# Patient Record
Sex: FEMALE | Race: WHITE | NOT HISPANIC OR LATINO | Employment: OTHER | URBAN - METROPOLITAN AREA
[De-identification: names, ages, dates, MRNs, and addresses within clinical notes are randomized per-mention and may not be internally consistent; named-entity substitution may affect disease eponyms.]

---

## 2020-12-15 NOTE — PROGRESS NOTES
Subjective:       Patient ID: Xochilt Self is a 35 y.o. female.    Chief Complaint:      Has had years of ( adult onset ) persistent asthma nd allergies. For follow up nd to get established at the clinic, [facility].    HPI:      Middle aged WF , with adult onset , labile and with everyday cough, wheeze and dyspnea. Was under the care of DR. Torsten Colorado Jr.  In spite of best medical Rx was always symptomatic. Could not tolerate Singulair and ICS- LAB combinations.    Currently doing well on ICS only QVAR and a OCTAVIO for rescue.    Dr Colorado did allergy work up and started on SCIT, was taken for 3 years. SCIT was discontinued 4 years ago. Now on symptoms Rx for allergies    Has a history of recurrent sinus infections.  Ex cigarette smoker, 10 cigs per day for 5 years. Quit smoking 12 years ago    No outpatient medications have been marked as taking for the 12/16/20 encounter (Appointment) with Jimi Robert MD.        Patient has no allergy information on record.     No past medical history on file.    No family history on file.    Environmental History:   Well versed with and has  successfully instituted environmental control measures      Review of Systems    Objective:     There were no vitals taken for this visit.    Physical Exam      Assessment:      1. Moderate persistent asthma without complication    2. Recurrent sinusitis    3. Seasonal allergic rhinitis due to pollen    4. Bronchitis    5. Seasonal allergic conjunctivitis    6       EIB  7       USED TO BE ON  SCIT FOR ALLERGIC RHINITIS, BY TORSTEN COLORADO JR, MD for 3 years. SCIT was discontinued over 4 years ago.  8        Smoked 10 cigarettes per day for 5 years. Quit smoking 12 years ago  9       Multiple drug allergies, sulfamethoxazole and Xopenex. Singulair made her crazy    Plan:       QVAR 80 Redihaler two puffs bid everyday. Gargle throat after.  CAN NOT TAKE ICS- LABA combinations, which made asthma worse  Proair HFA 90 mcg 2 puffs tid prn.  Takes 3 PUFFS IN THE AFTERNOON AND AT 9 PM, everyday, with good result.  Claritin 10 mg qd  Nasacort 55 mcg 1- 2 sprays per nares qd\bid  N acetyl cystine   BID everyday, as a mucolytic agent. Helping a lot  Re emphasized environmenatl control measures  Treat all infections.  Annual influenza vaccinations.  Due to COVID 19 protocol, spirogram and Fe NO could not be done    Home peak flow monitoring recommended. Peak Flow Meter provided and its use demonstrated  FOLLOW UP IN 6 MONTHS      Follow up in 4 months

## 2020-12-16 ENCOUNTER — OFFICE VISIT (OUTPATIENT)
Dept: ALLERGY | Facility: CLINIC | Age: 35
End: 2020-12-16
Payer: COMMERCIAL

## 2020-12-16 VITALS
BODY MASS INDEX: 33.02 KG/M2 | HEART RATE: 65 BPM | SYSTOLIC BLOOD PRESSURE: 125 MMHG | WEIGHT: 179.44 LBS | TEMPERATURE: 97 F | DIASTOLIC BLOOD PRESSURE: 76 MMHG | HEIGHT: 62 IN

## 2020-12-16 DIAGNOSIS — J40 BRONCHITIS: ICD-10-CM

## 2020-12-16 DIAGNOSIS — J30.1 SEASONAL ALLERGIC RHINITIS DUE TO POLLEN: ICD-10-CM

## 2020-12-16 DIAGNOSIS — J45.40 MODERATE PERSISTENT ASTHMA WITHOUT COMPLICATION: Primary | ICD-10-CM

## 2020-12-16 DIAGNOSIS — J32.9 RECURRENT SINUSITIS: ICD-10-CM

## 2020-12-16 DIAGNOSIS — H10.10 SEASONAL ALLERGIC CONJUNCTIVITIS: ICD-10-CM

## 2020-12-16 PROCEDURE — 99214 OFFICE O/P EST MOD 30 MIN: CPT | Mod: S$GLB,,, | Performed by: SPECIALIST

## 2020-12-16 PROCEDURE — 3008F PR BODY MASS INDEX (BMI) DOCUMENTED: ICD-10-PCS | Mod: CPTII,S$GLB,, | Performed by: SPECIALIST

## 2020-12-16 PROCEDURE — 3008F BODY MASS INDEX DOCD: CPT | Mod: CPTII,S$GLB,, | Performed by: SPECIALIST

## 2020-12-16 PROCEDURE — 99999 PR PBB SHADOW E&M-NEW PATIENT-LVL III: ICD-10-PCS | Mod: PBBFAC,,, | Performed by: SPECIALIST

## 2020-12-16 PROCEDURE — 99999 PR PBB SHADOW E&M-NEW PATIENT-LVL III: CPT | Mod: PBBFAC,,, | Performed by: SPECIALIST

## 2020-12-16 PROCEDURE — 99214 PR OFFICE/OUTPT VISIT, EST, LEVL IV, 30-39 MIN: ICD-10-PCS | Mod: S$GLB,,, | Performed by: SPECIALIST

## 2020-12-16 RX ORDER — ALBUTEROL SULFATE 90 UG/1
2 AEROSOL, METERED RESPIRATORY (INHALATION) EVERY 6 HOURS PRN
Qty: 18 G | Refills: 6 | Status: SHIPPED | OUTPATIENT
Start: 2020-12-16 | End: 2021-06-16

## 2020-12-16 RX ORDER — SULFAMETHOXAZOLE AND TRIMETHOPRIM 800; 160 MG/1; MG/1
TABLET ORAL
COMMUNITY
Start: 2020-11-17

## 2020-12-16 RX ORDER — LORATADINE 10 MG/1
10 TABLET ORAL DAILY PRN
COMMUNITY

## 2020-12-16 RX ORDER — ALBUTEROL SULFATE 90 UG/1
AEROSOL, METERED RESPIRATORY (INHALATION)
COMMUNITY
Start: 2020-10-27 | End: 2020-12-16

## 2020-12-16 RX ORDER — BECLOMETHASONE DIPROPIONATE HFA 80 UG/1
2 AEROSOL, METERED RESPIRATORY (INHALATION) 2 TIMES DAILY
Qty: 10.6 G | Refills: 6 | Status: SHIPPED | OUTPATIENT
Start: 2020-12-16 | End: 2021-06-16

## 2021-03-01 ENCOUNTER — PATIENT MESSAGE (OUTPATIENT)
Dept: GASTROENTEROLOGY | Facility: CLINIC | Age: 36
End: 2021-03-01

## 2021-03-01 ENCOUNTER — TELEPHONE (OUTPATIENT)
Dept: GASTROENTEROLOGY | Facility: CLINIC | Age: 36
End: 2021-03-01

## 2021-03-01 ENCOUNTER — OFFICE VISIT (OUTPATIENT)
Dept: GASTROENTEROLOGY | Facility: CLINIC | Age: 36
End: 2021-03-01
Payer: COMMERCIAL

## 2021-03-01 ENCOUNTER — LAB VISIT (OUTPATIENT)
Dept: LAB | Facility: HOSPITAL | Age: 36
End: 2021-03-01
Attending: INTERNAL MEDICINE
Payer: COMMERCIAL

## 2021-03-01 VITALS
SYSTOLIC BLOOD PRESSURE: 122 MMHG | HEIGHT: 62 IN | BODY MASS INDEX: 33.02 KG/M2 | HEART RATE: 62 BPM | DIASTOLIC BLOOD PRESSURE: 80 MMHG | WEIGHT: 179.44 LBS

## 2021-03-01 DIAGNOSIS — K58.1 IRRITABLE BOWEL SYNDROME WITH CONSTIPATION: ICD-10-CM

## 2021-03-01 DIAGNOSIS — R14.0 BLOATING: ICD-10-CM

## 2021-03-01 DIAGNOSIS — K58.1 IRRITABLE BOWEL SYNDROME WITH CONSTIPATION: Primary | ICD-10-CM

## 2021-03-01 LAB
BASOPHILS # BLD AUTO: 0.04 K/UL (ref 0–0.2)
BASOPHILS NFR BLD: 0.4 % (ref 0–1.9)
DIFFERENTIAL METHOD: ABNORMAL
EOSINOPHIL # BLD AUTO: 0.3 K/UL (ref 0–0.5)
EOSINOPHIL NFR BLD: 2.9 % (ref 0–8)
ERYTHROCYTE [DISTWIDTH] IN BLOOD BY AUTOMATED COUNT: 13 % (ref 11.5–14.5)
HCT VFR BLD AUTO: 37.2 % (ref 37–48.5)
HGB BLD-MCNC: 12.2 G/DL (ref 12–16)
IMM GRANULOCYTES # BLD AUTO: 0.07 K/UL (ref 0–0.04)
IMM GRANULOCYTES NFR BLD AUTO: 0.8 % (ref 0–0.5)
LYMPHOCYTES # BLD AUTO: 1.8 K/UL (ref 1–4.8)
LYMPHOCYTES NFR BLD: 19.7 % (ref 18–48)
MCH RBC QN AUTO: 30.4 PG (ref 27–31)
MCHC RBC AUTO-ENTMCNC: 32.8 G/DL (ref 32–36)
MCV RBC AUTO: 93 FL (ref 82–98)
MONOCYTES # BLD AUTO: 0.6 K/UL (ref 0.3–1)
MONOCYTES NFR BLD: 6 % (ref 4–15)
NEUTROPHILS # BLD AUTO: 6.5 K/UL (ref 1.8–7.7)
NEUTROPHILS NFR BLD: 70.2 % (ref 38–73)
NRBC BLD-RTO: 0 /100 WBC
PLATELET # BLD AUTO: 270 K/UL (ref 150–350)
PMV BLD AUTO: 10 FL (ref 9.2–12.9)
RBC # BLD AUTO: 4.01 M/UL (ref 4–5.4)
WBC # BLD AUTO: 9.28 K/UL (ref 3.9–12.7)

## 2021-03-01 PROCEDURE — 83630 LACTOFERRIN FECAL (QUAL): CPT

## 2021-03-01 PROCEDURE — 83993 ASSAY FOR CALPROTECTIN FECAL: CPT

## 2021-03-01 PROCEDURE — 83516 IMMUNOASSAY NONANTIBODY: CPT | Mod: 59

## 2021-03-01 PROCEDURE — 3008F BODY MASS INDEX DOCD: CPT | Mod: CPTII,S$GLB,, | Performed by: INTERNAL MEDICINE

## 2021-03-01 PROCEDURE — 99244 PR OFFICE CONSULTATION,LEVEL IV: ICD-10-PCS | Mod: S$GLB,,, | Performed by: INTERNAL MEDICINE

## 2021-03-01 PROCEDURE — 1126F PR PAIN SEVERITY QUANTIFIED, NO PAIN PRESENT: ICD-10-PCS | Mod: S$GLB,,, | Performed by: INTERNAL MEDICINE

## 2021-03-01 PROCEDURE — 99999 PR PBB SHADOW E&M-EST. PATIENT-LVL III: ICD-10-PCS | Mod: PBBFAC,,, | Performed by: INTERNAL MEDICINE

## 2021-03-01 PROCEDURE — 36415 COLL VENOUS BLD VENIPUNCTURE: CPT

## 2021-03-01 PROCEDURE — 85025 COMPLETE CBC W/AUTO DIFF WBC: CPT

## 2021-03-01 PROCEDURE — 80048 BASIC METABOLIC PNL TOTAL CA: CPT

## 2021-03-01 PROCEDURE — 99999 PR PBB SHADOW E&M-EST. PATIENT-LVL III: CPT | Mod: PBBFAC,,, | Performed by: INTERNAL MEDICINE

## 2021-03-01 PROCEDURE — 1126F AMNT PAIN NOTED NONE PRSNT: CPT | Mod: S$GLB,,, | Performed by: INTERNAL MEDICINE

## 2021-03-01 PROCEDURE — 3008F PR BODY MASS INDEX (BMI) DOCUMENTED: ICD-10-PCS | Mod: CPTII,S$GLB,, | Performed by: INTERNAL MEDICINE

## 2021-03-01 PROCEDURE — 99244 OFF/OP CNSLTJ NEW/EST MOD 40: CPT | Mod: S$GLB,,, | Performed by: INTERNAL MEDICINE

## 2021-03-01 RX ORDER — ALUMINUM ZIRCONIUM OCTACHLOROHYDREX GLY 16 G/100G
1 GEL TOPICAL DAILY
COMMUNITY
Start: 2021-03-01 | End: 2022-04-27

## 2021-03-01 RX ORDER — ACETAMINOPHEN 500 MG
1 TABLET ORAL 2 TIMES DAILY
COMMUNITY

## 2021-03-02 LAB
ANION GAP SERPL CALC-SCNC: 10 MMOL/L (ref 8–16)
BUN SERPL-MCNC: 9 MG/DL (ref 6–20)
CALCIUM SERPL-MCNC: 9.1 MG/DL (ref 8.7–10.5)
CHLORIDE SERPL-SCNC: 107 MMOL/L (ref 95–110)
CO2 SERPL-SCNC: 24 MMOL/L (ref 23–29)
CREAT SERPL-MCNC: 0.8 MG/DL (ref 0.5–1.4)
EST. GFR  (AFRICAN AMERICAN): >60 ML/MIN/1.73 M^2
EST. GFR  (NON AFRICAN AMERICAN): >60 ML/MIN/1.73 M^2
GLUCOSE SERPL-MCNC: 86 MG/DL (ref 70–110)
POTASSIUM SERPL-SCNC: 4.1 MMOL/L (ref 3.5–5.1)
SODIUM SERPL-SCNC: 141 MMOL/L (ref 136–145)

## 2021-03-04 LAB
GLIADIN PEPTIDE IGA SER-ACNC: 4 UNITS
GLIADIN PEPTIDE IGG SER-ACNC: 3 UNITS
IGA SERPL-MCNC: 107 MG/DL (ref 70–400)
TTG IGA SER-ACNC: 3 UNITS
TTG IGG SER-ACNC: 3 UNITS

## 2021-03-05 LAB — LACTOFERRIN STL QL IA: NEGATIVE

## 2021-03-08 ENCOUNTER — PATIENT MESSAGE (OUTPATIENT)
Dept: HEPATOLOGY | Facility: CLINIC | Age: 36
End: 2021-03-08

## 2021-03-08 LAB — CALPROTECTIN STL-MCNT: <27.1 MCG/G

## 2021-03-09 ENCOUNTER — PATIENT MESSAGE (OUTPATIENT)
Dept: GASTROENTEROLOGY | Facility: CLINIC | Age: 36
End: 2021-03-09

## 2021-03-26 ENCOUNTER — TELEPHONE (OUTPATIENT)
Dept: GASTROENTEROLOGY | Facility: CLINIC | Age: 36
End: 2021-03-26

## 2021-03-26 DIAGNOSIS — R14.0 BLOATING: ICD-10-CM

## 2021-03-26 DIAGNOSIS — K58.1 IRRITABLE BOWEL SYNDROME WITH CONSTIPATION: Primary | ICD-10-CM

## 2021-04-08 ENCOUNTER — PATIENT MESSAGE (OUTPATIENT)
Dept: GASTROENTEROLOGY | Facility: CLINIC | Age: 36
End: 2021-04-08

## 2021-04-22 ENCOUNTER — PATIENT MESSAGE (OUTPATIENT)
Dept: GASTROENTEROLOGY | Facility: CLINIC | Age: 36
End: 2021-04-22

## 2021-04-28 ENCOUNTER — PATIENT MESSAGE (OUTPATIENT)
Dept: GASTROENTEROLOGY | Facility: CLINIC | Age: 36
End: 2021-04-28

## 2021-04-29 ENCOUNTER — PATIENT MESSAGE (OUTPATIENT)
Dept: GASTROENTEROLOGY | Facility: CLINIC | Age: 36
End: 2021-04-29

## 2021-06-16 ENCOUNTER — LAB VISIT (OUTPATIENT)
Dept: LAB | Facility: HOSPITAL | Age: 36
End: 2021-06-16
Attending: SPECIALIST
Payer: COMMERCIAL

## 2021-06-16 ENCOUNTER — OFFICE VISIT (OUTPATIENT)
Dept: ALLERGY | Facility: CLINIC | Age: 36
End: 2021-06-16
Payer: COMMERCIAL

## 2021-06-16 VITALS
DIASTOLIC BLOOD PRESSURE: 81 MMHG | WEIGHT: 172.19 LBS | TEMPERATURE: 98 F | SYSTOLIC BLOOD PRESSURE: 117 MMHG | BODY MASS INDEX: 31.68 KG/M2 | HEIGHT: 62 IN | HEART RATE: 62 BPM

## 2021-06-16 DIAGNOSIS — J40 BRONCHITIS: ICD-10-CM

## 2021-06-16 DIAGNOSIS — J45.990 EXERCISE INDUCED BRONCHOSPASM: ICD-10-CM

## 2021-06-16 DIAGNOSIS — J32.9 RECURRENT SINUSITIS: ICD-10-CM

## 2021-06-16 DIAGNOSIS — J30.1 SEASONAL ALLERGIC RHINITIS DUE TO POLLEN: ICD-10-CM

## 2021-06-16 DIAGNOSIS — H10.10 SEASONAL ALLERGIC CONJUNCTIVITIS: ICD-10-CM

## 2021-06-16 DIAGNOSIS — J30.89 NON-SEASONAL ALLERGIC RHINITIS DUE TO OTHER ALLERGIC TRIGGER: ICD-10-CM

## 2021-06-16 DIAGNOSIS — J45.40 MODERATE PERSISTENT ASTHMA WITHOUT COMPLICATION: Primary | ICD-10-CM

## 2021-06-16 DIAGNOSIS — J45.40 MODERATE PERSISTENT ASTHMA WITHOUT COMPLICATION: ICD-10-CM

## 2021-06-16 LAB
IGA SERPL-MCNC: 123 MG/DL (ref 40–350)
IGE SERPL-ACNC: 134 IU/ML (ref 0–100)
IGG SERPL-MCNC: 642 MG/DL (ref 650–1600)
IGM SERPL-MCNC: 91 MG/DL (ref 50–300)

## 2021-06-16 PROCEDURE — 99999 PR PBB SHADOW E&M-EST. PATIENT-LVL III: ICD-10-PCS | Mod: PBBFAC,,, | Performed by: SPECIALIST

## 2021-06-16 PROCEDURE — 99214 PR OFFICE/OUTPT VISIT, EST, LEVL IV, 30-39 MIN: ICD-10-PCS | Mod: S$GLB,,, | Performed by: SPECIALIST

## 2021-06-16 PROCEDURE — 99214 OFFICE O/P EST MOD 30 MIN: CPT | Mod: S$GLB,,, | Performed by: SPECIALIST

## 2021-06-16 PROCEDURE — 99999 PR PBB SHADOW E&M-EST. PATIENT-LVL III: CPT | Mod: PBBFAC,,, | Performed by: SPECIALIST

## 2021-06-16 PROCEDURE — 36415 COLL VENOUS BLD VENIPUNCTURE: CPT | Performed by: SPECIALIST

## 2021-06-16 PROCEDURE — 82784 ASSAY IGA/IGD/IGG/IGM EACH: CPT | Mod: 59 | Performed by: SPECIALIST

## 2021-06-16 PROCEDURE — 3008F PR BODY MASS INDEX (BMI) DOCUMENTED: ICD-10-PCS | Mod: CPTII,S$GLB,, | Performed by: SPECIALIST

## 2021-06-16 PROCEDURE — 82784 ASSAY IGA/IGD/IGG/IGM EACH: CPT | Performed by: SPECIALIST

## 2021-06-16 PROCEDURE — 86317 IMMUNOASSAY INFECTIOUS AGENT: CPT | Mod: 59 | Performed by: SPECIALIST

## 2021-06-16 PROCEDURE — 82785 ASSAY OF IGE: CPT | Performed by: SPECIALIST

## 2021-06-16 PROCEDURE — 3008F BODY MASS INDEX DOCD: CPT | Mod: CPTII,S$GLB,, | Performed by: SPECIALIST

## 2021-06-16 RX ORDER — BECLOMETHASONE DIPROPIONATE HFA 80 UG/1
160 AEROSOL, METERED RESPIRATORY (INHALATION) 2 TIMES DAILY
Qty: 10.6 G | Refills: 5 | Status: SHIPPED | OUTPATIENT
Start: 2021-06-16 | End: 2021-07-16

## 2021-06-16 RX ORDER — ALBUTEROL SULFATE 90 UG/1
2 AEROSOL, METERED RESPIRATORY (INHALATION) EVERY 6 HOURS PRN
Qty: 18 G | Refills: 5 | Status: SHIPPED | OUTPATIENT
Start: 2021-06-16 | End: 2021-07-16

## 2021-06-16 RX ORDER — ALBUTEROL SULFATE 0.83 MG/ML
2.5 SOLUTION RESPIRATORY (INHALATION) EVERY 6 HOURS PRN
Qty: 1 BOX | Refills: 2 | Status: SHIPPED | OUTPATIENT
Start: 2021-06-16 | End: 2022-12-13

## 2021-06-16 RX ORDER — BUDESONIDE 0.5 MG/2ML
0.5 INHALANT ORAL 2 TIMES DAILY PRN
Qty: 120 ML | Refills: 2 | Status: SHIPPED | OUTPATIENT
Start: 2021-06-16 | End: 2021-08-09

## 2021-06-16 RX ORDER — BUSPIRONE HYDROCHLORIDE 10 MG/1
TABLET ORAL
COMMUNITY

## 2021-06-16 RX ORDER — PREDNISONE 20 MG/1
20 TABLET ORAL DAILY PRN
Qty: 15 TABLET | Refills: 0 | Status: SHIPPED | OUTPATIENT
Start: 2021-06-16 | End: 2021-06-21

## 2021-06-21 LAB
S PNEUM DA 1 IGG SER-MCNC: 11.4 MCG/ML
S PNEUM DA 10A IGG SER-MCNC: 10.9 MCG/ML
S PNEUM DA 11A IGG SER-MCNC: 6.9 MCG/ML
S PNEUM DA 12F IGG SER-MCNC: 12.8 MCG/ML
S PNEUM DA 14 IGG SER-MCNC: 17 MCG/ML
S PNEUM DA 15B IGG SER-MCNC: 13.4 MCG/ML
S PNEUM DA 17F IGG SER-MCNC: 46.6 MCG/ML
S PNEUM DA 18C IGG SER-MCNC: 6.3 MCG/ML
S PNEUM DA 19A IGG SER-MCNC: >150 MCG/ML
S PNEUM DA 2 IGG SER-MCNC: 44.3 MCG/ML
S PNEUM DA 20A IGG SER-MCNC: 11.8 MCG/ML
S PNEUM DA 22F IGG SER-MCNC: 32.2 MCG/ML
S PNEUM DA 23F IGG SER-MCNC: 39.1 MCG/ML
S PNEUM DA 3 IGG SER-MCNC: 8.3 MCG/ML
S PNEUM DA 33F IGG SER-MCNC: 11.8 MCG/ML
S PNEUM DA 4 IGG SER-MCNC: 2.3 MCG/ML
S PNEUM DA 5 IGG SER-MCNC: 7.8 MCG/ML
S PNEUM DA 6B IGG SER-MCNC: 13.9 MCG/ML
S PNEUM DA 7F IGG SER-MCNC: 16.6 MCG/ML
S PNEUM DA 8 IGG SER-MCNC: 11.4 MCG/ML
S PNEUM DA 9N IGG SER-MCNC: NORMAL UG/ML
S PNEUM DA 9V IGG SER-MCNC: 9.9 MCG/ML
S.PNEUMONIAE TYPE 19F: 34 MCG/ML

## 2021-07-08 ENCOUNTER — OFFICE VISIT (OUTPATIENT)
Dept: ALLERGY | Facility: CLINIC | Age: 36
End: 2021-07-08
Payer: COMMERCIAL

## 2021-07-08 VITALS
DIASTOLIC BLOOD PRESSURE: 76 MMHG | HEART RATE: 69 BPM | TEMPERATURE: 98 F | WEIGHT: 169.56 LBS | HEIGHT: 62 IN | SYSTOLIC BLOOD PRESSURE: 114 MMHG | BODY MASS INDEX: 31.2 KG/M2

## 2021-07-08 DIAGNOSIS — J45.40 MODERATE PERSISTENT ASTHMA WITHOUT COMPLICATION: Primary | ICD-10-CM

## 2021-07-08 DIAGNOSIS — J40 BRONCHITIS: ICD-10-CM

## 2021-07-08 DIAGNOSIS — J45.990 EXERCISE-INDUCED BRONCHOSPASM: ICD-10-CM

## 2021-07-08 DIAGNOSIS — J30.89 PERENNIAL ALLERGIC RHINITIS: ICD-10-CM

## 2021-07-08 DIAGNOSIS — J32.9 RECURRENT SINUSITIS: ICD-10-CM

## 2021-07-08 PROCEDURE — 99999 PR PBB SHADOW E&M-EST. PATIENT-LVL III: CPT | Mod: PBBFAC,,, | Performed by: SPECIALIST

## 2021-07-08 PROCEDURE — 3008F PR BODY MASS INDEX (BMI) DOCUMENTED: ICD-10-PCS | Mod: CPTII,S$GLB,, | Performed by: SPECIALIST

## 2021-07-08 PROCEDURE — 99214 OFFICE O/P EST MOD 30 MIN: CPT | Mod: S$GLB,,, | Performed by: SPECIALIST

## 2021-07-08 PROCEDURE — 99999 PR PBB SHADOW E&M-EST. PATIENT-LVL III: ICD-10-PCS | Mod: PBBFAC,,, | Performed by: SPECIALIST

## 2021-07-08 PROCEDURE — 99214 PR OFFICE/OUTPT VISIT, EST, LEVL IV, 30-39 MIN: ICD-10-PCS | Mod: S$GLB,,, | Performed by: SPECIALIST

## 2021-07-08 PROCEDURE — 3008F BODY MASS INDEX DOCD: CPT | Mod: CPTII,S$GLB,, | Performed by: SPECIALIST

## 2021-07-28 ENCOUNTER — TELEPHONE (OUTPATIENT)
Dept: ALLERGY | Facility: CLINIC | Age: 36
End: 2021-07-28

## 2021-08-02 ENCOUNTER — TELEPHONE (OUTPATIENT)
Dept: ALLERGY | Facility: CLINIC | Age: 36
End: 2021-08-02

## 2021-12-29 ENCOUNTER — TELEPHONE (OUTPATIENT)
Dept: ALLERGY | Facility: CLINIC | Age: 36
End: 2021-12-29
Payer: COMMERCIAL

## 2022-01-18 NOTE — PROGRESS NOTES
Subjective:       Patient ID: Xochilt Self is a 36 y.o. female.    Chief Complaint:    FOLLOW UP ON PERENNIAL RHINITIS AND MODERATE PERSISTENT ASTHMA    HPI:     female with moderate persisten asthma--- doing well on ICS- Beclomethasone propionate,  QVAR as an asthma controller--      In the past had severe asthma with multiple flare ups-- but did not do well on ANY LABA-- Cohld not tolerate any LABA./ levalbuterol nebulizzer solution caused anaphylaxis ( anaphylaxis ).   CAN TAKE ALBUTEROL    Per allergy work up, had allergies to multiple aero allergens-  Last tested by Torsten Colorado MD-- She was on SCIT for 3 years, AIT was stopped about 4 years ago.   Can not take Singulair     Is an ex cigarette smoker. Smoked 10 cigarettes per day for 10 years and stopped about 13 years ago.  No ongoing allergens or irritants exposure. Not currently on AIT. Can not do FeNO or spirogram due to COVID protocol    Is well versed with allergen avoidance and is aware of asthma triggers    HAS ALLERGY TO PCN, SULFA AND LEVALBUTEROL NEBULIZED SOLUTION  CAN TAKE ALBUTEROL  HAD COVID in July 2021. DID NOT TAKE COVID vaccine or flu shot this year. [place] [facility] [place] If not having protective antibodies, will take COVID vaccine.    Retired realtor.      Past Medical History:   Diagnosis Date    ADD (attention deficit disorder)     Allergic rhinitis     Allergy     Anxiety     Asthma     Extrinsic asthma     Irritable bowel syndrome        Family History   Problem Relation Age of Onset    Asthma Mother     Colon cancer Neg Hx     Stomach cancer Neg Hx        Environmental History:  --- no    Review of Systems   Constitutional: Negative.  Negative for fatigue and fever.   HENT: Positive for congestion. Negative for ear pain, postnasal drip, rhinorrhea, sinus pressure, sinus pain, sneezing and sore throat.    Eyes: Negative.  Negative for redness and itching.   Respiratory: Negative.  Negative for  cough, chest tightness, shortness of breath and wheezing.    Cardiovascular: Negative.  Negative for chest pain.   Gastrointestinal: Negative.  Negative for nausea.   Endocrine: Negative.  Negative for cold intolerance.   Genitourinary: Negative.  Negative for frequency.   Musculoskeletal: Negative.  Negative for myalgias.   Skin: Negative.  Negative for rash.   Allergic/Immunologic: Positive for environmental allergies. Negative for food allergies and immunocompromised state.   Neurological: Negative.  Negative for dizziness and headaches.   Hematological: Negative.  Negative for adenopathy.   Psychiatric/Behavioral: Negative.  Negative for sleep disturbance.       Objective:     There were no vitals taken for this visit.    Physical Exam  Vitals and nursing note reviewed.   Constitutional:       Appearance: Normal appearance. She is normal weight.   HENT:      Head: Normocephalic and atraumatic.      Right Ear: Tympanic membrane, ear canal and external ear normal.      Left Ear: Tympanic membrane, ear canal and external ear normal.      Nose: Nose normal.      Mouth/Throat:      Mouth: Mucous membranes are moist.      Pharynx: Oropharynx is clear.   Eyes:      Extraocular Movements: Extraocular movements intact.      Conjunctiva/sclera: Conjunctivae normal.      Pupils: Pupils are equal, round, and reactive to light.   Cardiovascular:      Rate and Rhythm: Normal rate and regular rhythm.      Pulses: Normal pulses.      Heart sounds: Normal heart sounds.   Pulmonary:      Effort: Pulmonary effort is normal.      Breath sounds: Normal breath sounds.   Abdominal:      General: Abdomen is flat. Bowel sounds are normal.      Palpations: Abdomen is soft.   Musculoskeletal:         General: Normal range of motion.      Cervical back: Normal range of motion and neck supple.   Skin:     General: Skin is warm and dry.      Capillary Refill: Capillary refill takes less than 2 seconds.   Neurological:      General: No focal  deficit present.      Mental Status: She is alert and oriented to person, place, and time. Mental status is at baseline.   Psychiatric:         Mood and Affect: Mood normal.         Behavior: Behavior normal.         Thought Content: Thought content normal.         Judgment: Judgment normal.           Assessment:      1. Moderate persistent asthma without complication    2. Recurrent sinusitis    3. Bronchitis    4. Perennial allergic rhinitis with seasonal variation    5. Cough    6       DOES WELL ON QVAR -- not on any LABA, in the past    7      Stopped 3 years of AIT-- 5 years ago by Torsten Colorado Jr, MD  8      HISTORY OF ALLERGY TO PCN, SULFA,  AND LEVALBUTEROL neb solution. Can take albuterol  9      History of SIBO treated with with Metronidazole by Viviana Benítez MD. HAD NORMAL CELIAC DISEASE AND FECAL CALPROTECTIN    Plan:     IMMUNE EVALUATION WAS DONE ON  06- 16- 2021. NORMAL PNEUMOCOCCAL AB TITERS TO ALL 23 SEROTYPES PROTECTIVE  LOW NORMAL IgG 642 mg \ dl and IgE  134 ku/L ( 0- 100 K)  Spirogram not done- COVID protocol  QVAR 80 mcg-- two puffs bid  Neb treatment with albuterol 0.083  Plus budesonide 0.50 mg bid prn  Proair HFA 90 mcg  Two - 3 puffs bid DAILY  and prn before exercise  Prednisone 20 mg qd for 3- 5 days for asthma flare ups  Nasacort 55 mcg qd or bid  Claritin 10 mg qd  COVID vaccine -- 2-3  doses recommended ( awaiting post infectious titers after July 2021 COVID )  Annual influenza vaccination  No reason to offer PCV- 13 OR PNEUMOVAX  Follow up in 6 months                  Problems Address                                                 Amount and/or Complexity                                                                      Risk       3           [] 2 or more self-limited or minor problems                      [] Limited                                                                        [] Low                  [] 1 stable chronic illness                                                   Any combination of the two                                               OTC drugs                  []Acute, uncomplicated illness or injury                            Review of prior external notes from unique source           Minor surgery with no risk factors                                                                                                               [] 1 []2  []3+                                                                                                              Review of results from each unique test                                                                                                               [] 1 []2  [] 3+                                                                                                              Order of each unique test                                                                                                               [] 1 []2  [] 3+                                                                                                              Or                                                                                                             [] Assessment requiring an independent historian      4            [x] One or more chronic illness with exacerbation,              [x] Moderate                                                                      [x] Moderate                 Progression, or side effects of treatment                            -test documents or independent historians                        Prescription drug management                [x]  2 or more sable chronic illnesses                                    [] Independent interpretation of tests                              Minor surgery with identifiable risk                [] 1 undiagnosed new problem with uncertain prognosis    [] Discussion or management of test results                    elective major surgery                [] 1 acute illness with                 systemic symptoms                                                                                                                                                              [] 1 acute complicated injury                                                                                                                                          Elective major surgery                                                                                                                                                                                                                                                                                                                                                                                                  5            [] 1 or more chronic illnesses with severe exacerbation,     [] Extensive(two from below)                                         [] High                                                                                                               [] Independent interpretation of results                         Drug therapy requiring intensive                                                                                                               []Discussion of management or test interpretation           monitoring                                                                                                                                                                                                       Decision to de-escalate care                 [] 1 acute or chronic illness or injury that poses a threat                                                                                               Decision regarding hospitalization

## 2022-01-19 ENCOUNTER — OFFICE VISIT (OUTPATIENT)
Dept: ALLERGY | Facility: CLINIC | Age: 37
End: 2022-01-19
Payer: COMMERCIAL

## 2022-01-19 VITALS
HEIGHT: 62 IN | SYSTOLIC BLOOD PRESSURE: 116 MMHG | TEMPERATURE: 98 F | DIASTOLIC BLOOD PRESSURE: 78 MMHG | HEART RATE: 68 BPM | BODY MASS INDEX: 31.11 KG/M2 | WEIGHT: 169.06 LBS

## 2022-01-19 DIAGNOSIS — J32.9 RECURRENT SINUSITIS: ICD-10-CM

## 2022-01-19 DIAGNOSIS — J45.40 MODERATE PERSISTENT ASTHMA WITHOUT COMPLICATION: Primary | ICD-10-CM

## 2022-01-19 DIAGNOSIS — J30.2 PERENNIAL ALLERGIC RHINITIS WITH SEASONAL VARIATION: ICD-10-CM

## 2022-01-19 DIAGNOSIS — J40 BRONCHITIS: ICD-10-CM

## 2022-01-19 DIAGNOSIS — J30.89 PERENNIAL ALLERGIC RHINITIS WITH SEASONAL VARIATION: ICD-10-CM

## 2022-01-19 DIAGNOSIS — R05.9 COUGH: ICD-10-CM

## 2022-01-19 PROCEDURE — 99999 PR PBB SHADOW E&M-EST. PATIENT-LVL III: CPT | Mod: PBBFAC,,, | Performed by: SPECIALIST

## 2022-01-19 PROCEDURE — 3078F DIAST BP <80 MM HG: CPT | Mod: CPTII,S$GLB,, | Performed by: SPECIALIST

## 2022-01-19 PROCEDURE — 3008F PR BODY MASS INDEX (BMI) DOCUMENTED: ICD-10-PCS | Mod: CPTII,S$GLB,, | Performed by: SPECIALIST

## 2022-01-19 PROCEDURE — 99214 PR OFFICE/OUTPT VISIT, EST, LEVL IV, 30-39 MIN: ICD-10-PCS | Mod: S$GLB,,, | Performed by: SPECIALIST

## 2022-01-19 PROCEDURE — 1159F MED LIST DOCD IN RCRD: CPT | Mod: CPTII,S$GLB,, | Performed by: SPECIALIST

## 2022-01-19 PROCEDURE — 99214 OFFICE O/P EST MOD 30 MIN: CPT | Mod: S$GLB,,, | Performed by: SPECIALIST

## 2022-01-19 PROCEDURE — 99999 PR PBB SHADOW E&M-EST. PATIENT-LVL III: ICD-10-PCS | Mod: PBBFAC,,, | Performed by: SPECIALIST

## 2022-01-19 PROCEDURE — 1160F PR REVIEW ALL MEDS BY PRESCRIBER/CLIN PHARMACIST DOCUMENTED: ICD-10-PCS | Mod: CPTII,S$GLB,, | Performed by: SPECIALIST

## 2022-01-19 PROCEDURE — 1160F RVW MEDS BY RX/DR IN RCRD: CPT | Mod: CPTII,S$GLB,, | Performed by: SPECIALIST

## 2022-01-19 PROCEDURE — 3008F BODY MASS INDEX DOCD: CPT | Mod: CPTII,S$GLB,, | Performed by: SPECIALIST

## 2022-01-19 PROCEDURE — 1159F PR MEDICATION LIST DOCUMENTED IN MEDICAL RECORD: ICD-10-PCS | Mod: CPTII,S$GLB,, | Performed by: SPECIALIST

## 2022-01-19 PROCEDURE — 3074F PR MOST RECENT SYSTOLIC BLOOD PRESSURE < 130 MM HG: ICD-10-PCS | Mod: CPTII,S$GLB,, | Performed by: SPECIALIST

## 2022-01-19 PROCEDURE — 3078F PR MOST RECENT DIASTOLIC BLOOD PRESSURE < 80 MM HG: ICD-10-PCS | Mod: CPTII,S$GLB,, | Performed by: SPECIALIST

## 2022-01-19 PROCEDURE — 3074F SYST BP LT 130 MM HG: CPT | Mod: CPTII,S$GLB,, | Performed by: SPECIALIST

## 2022-01-19 RX ORDER — ALBUTEROL SULFATE 90 UG/1
2 AEROSOL, METERED RESPIRATORY (INHALATION) 2 TIMES DAILY PRN
Qty: 36 G | Refills: 5 | Status: SHIPPED | OUTPATIENT
Start: 2022-01-19 | End: 2022-02-18

## 2022-01-19 RX ORDER — BECLOMETHASONE DIPROPIONATE HFA 80 UG/1
160 AEROSOL, METERED RESPIRATORY (INHALATION) 2 TIMES DAILY
Qty: 10.6 G | Refills: 6 | Status: SHIPPED | OUTPATIENT
Start: 2022-01-19 | End: 2022-02-18

## 2022-02-02 ENCOUNTER — TELEPHONE (OUTPATIENT)
Dept: ALLERGY | Facility: CLINIC | Age: 37
End: 2022-02-02
Payer: COMMERCIAL

## 2022-02-02 NOTE — TELEPHONE ENCOUNTER
Called pt insurance and did PA over the phone with [employer], we will receive a fax with the determination within 72 hours, ef number is [phone]. If denied pt must try Flovent HFA or Diskus or Srinivasan Holbrook.

## 2022-02-02 NOTE — TELEPHONE ENCOUNTER
----- Message from Yoko Rivera sent at 2/2/2022 10:38 AM CST -----  Contact: Xochilt  Patient would like a call back at [phone] in regards to her medication. She states that her insurance has not received her pre authorization for her medication yet.    Thanks

## 2022-08-29 ENCOUNTER — TELEPHONE (OUTPATIENT)
Dept: ALLERGY | Facility: CLINIC | Age: 37
End: 2022-08-29
Payer: COMMERCIAL

## 2022-08-29 NOTE — TELEPHONE ENCOUNTER
----- Message from Tuyet Drew sent at 8/29/2022  1:38 PM CDT -----  Regarding: inhaler  Contact: patient  Patient is requesting a refill on  her inhaler and needs an appt, please call her back at [phone]

## 2022-08-29 NOTE — TELEPHONE ENCOUNTER
Unable to leave a message - voice mail not set up   +lose stool, +abdominal pain/COUGH/FEVER/HEADACHE/PAIN

## 2022-10-10 ENCOUNTER — TELEPHONE (OUTPATIENT)
Dept: INFECTIOUS DISEASES | Facility: CLINIC | Age: 37
End: 2022-10-10
Payer: COMMERCIAL

## 2022-10-10 NOTE — TELEPHONE ENCOUNTER
----- Message from Hayley James sent at 10/7/2022  2:47 PM CDT -----  States she would like do schedule an appt. States she thinks she might have a parasite. Nothing coming up on the schedule. Please call pt [phone]. Thank you

## 2022-10-12 ENCOUNTER — TELEPHONE (OUTPATIENT)
Dept: INFECTIOUS DISEASES | Facility: CLINIC | Age: 37
End: 2022-10-12
Payer: COMMERCIAL

## 2022-10-12 NOTE — TELEPHONE ENCOUNTER
----- Message from Angi Mortensen sent at 10/12/2022  8:15 AM CDT -----  Pt would like the nurse to call her back regarding an appt. She stated someone called her but I don't see any communication. Call back number is [phone]. Thx. EL

## 2022-11-21 ENCOUNTER — TELEPHONE (OUTPATIENT)
Dept: ALLERGY | Facility: CLINIC | Age: 37
End: 2022-11-21
Payer: COMMERCIAL

## 2022-11-21 NOTE — TELEPHONE ENCOUNTER
Spoke with pt, she needs a refill on her Albuterol inhaler but with different instructions because her insurance is only letting her refill it every 45 days. She will contact Dr Dasilva since Dr Robert is out of town. If there is a problem, she will call us back and let us know.

## 2022-11-21 NOTE — TELEPHONE ENCOUNTER
----- Message from Lisandro Romero sent at 11/21/2022  1:22 PM CST -----  Contact: Xochilt Lemon would like a call back at [phone] in regards to the pharmacy not refilling her inhaler and suggested that the office sends over a new prescription with revised instructions on how the patient takes it.    [facility]   [address] [place] [phone]  Thanks   am

## 2022-11-22 ENCOUNTER — TELEPHONE (OUTPATIENT)
Dept: ALLERGY | Facility: CLINIC | Age: 37
End: 2022-11-22
Payer: COMMERCIAL

## 2022-11-22 NOTE — TELEPHONE ENCOUNTER
----- Message from Gillian Lang sent at 11/22/2022  3:44 PM CST -----  [facility] is calling in regards to patient inhaler instruction..Please call her back [phone]..Thanks

## 2022-11-23 ENCOUNTER — TELEPHONE (OUTPATIENT)
Dept: ALLERGY | Facility: CLINIC | Age: 37
End: 2022-11-23
Payer: COMMERCIAL

## 2022-11-23 NOTE — TELEPHONE ENCOUNTER
----- Message from Lisandro Romero sent at 11/23/2022 12:07 PM CST -----  Contact: Xochilt Lemon would like a call back at [phone] in regards to the pharmacy not refilling her inhaler and suggested that the office sends over a new prescription with revised instructions on how the patient takes it. Patient states Pharmacy is just waiting on a call from the office.   [facility]   [address] [place] [address] [phone]  Thanks   am

## 2023-05-03 ENCOUNTER — PATIENT MESSAGE (OUTPATIENT)
Dept: ALLERGY | Facility: CLINIC | Age: 38
End: 2023-05-03
Payer: COMMERCIAL

## 2023-05-03 ENCOUNTER — TELEPHONE (OUTPATIENT)
Dept: ALLERGY | Facility: CLINIC | Age: 38
End: 2023-05-03
Payer: COMMERCIAL

## 2023-05-03 NOTE — TELEPHONE ENCOUNTER
----- Message from Jamila Maloney LPN sent at 5/3/2023  3:38 PM CDT -----  Contact: [phone]    ----- Message -----  From: Helena Clarke  Sent: 5/3/2023   3:16 PM CDT  To: Jakob Krause Staff    Pt is calling requesting mediation refill, albuterol (PROVENTIL) 2.5 mg /3 mL (0.083 %) nebulizer solution,budesonide solution . Please call back at [phone] . [facility] [address] [address]  Phone: [phone] Fax: [phone]

## 2023-05-03 NOTE — TELEPHONE ENCOUNTER
Spoke with pt. Advised that we will need to see her in follow up before refilling prescriptions. Pt voiced understanding and will check her calendar.

## 2023-07-14 ENCOUNTER — TELEPHONE (OUTPATIENT)
Dept: DERMATOLOGY | Facility: CLINIC | Age: 38
End: 2023-07-14
Payer: COMMERCIAL

## 2023-07-14 NOTE — TELEPHONE ENCOUNTER
Returned call. Patient informed there was nothing open today. Pt reports that is ok and will keep appointment scheduled for next week   ----- Message from Dania Nieves sent at 7/14/2023 11:21 AM CDT -----  Name of Who is Calling: Patient           What is the request in detail:Patient found new bump on her Vagina and was scheduled with soonest available on Tuesday 07/18 but is requesting something sooner. I also added patient to wait list.           Can the clinic reply by [facility]:no           What Number to Call Back if not in [facility]: [phone]

## 2024-02-08 ENCOUNTER — OFFICE VISIT (OUTPATIENT)
Dept: ALLERGY | Facility: CLINIC | Age: 39
End: 2024-02-08
Payer: COMMERCIAL

## 2024-02-08 ENCOUNTER — TELEPHONE (OUTPATIENT)
Dept: ALLERGY | Facility: CLINIC | Age: 39
End: 2024-02-08
Payer: COMMERCIAL

## 2024-02-08 VITALS
BODY MASS INDEX: 24.95 KG/M2 | DIASTOLIC BLOOD PRESSURE: 84 MMHG | TEMPERATURE: 98 F | HEIGHT: 62 IN | SYSTOLIC BLOOD PRESSURE: 123 MMHG | WEIGHT: 135.56 LBS | HEART RATE: 69 BPM

## 2024-02-08 DIAGNOSIS — R05.9 COUGH, UNSPECIFIED TYPE: ICD-10-CM

## 2024-02-08 DIAGNOSIS — R06.00 DYSPNEA, UNSPECIFIED TYPE: ICD-10-CM

## 2024-02-08 DIAGNOSIS — H10.10 SEASONAL ALLERGIC CONJUNCTIVITIS: ICD-10-CM

## 2024-02-08 DIAGNOSIS — J30.2 PERENNIAL ALLERGIC RHINITIS WITH SEASONAL VARIATION: ICD-10-CM

## 2024-02-08 DIAGNOSIS — J40 BRONCHITIS: ICD-10-CM

## 2024-02-08 DIAGNOSIS — J30.89 PERENNIAL ALLERGIC RHINITIS WITH SEASONAL VARIATION: ICD-10-CM

## 2024-02-08 DIAGNOSIS — J32.9 RECURRENT SINUSITIS: ICD-10-CM

## 2024-02-08 DIAGNOSIS — J45.41 MODERATE PERSISTENT ASTHMA WITH ACUTE EXACERBATION: ICD-10-CM

## 2024-02-08 DIAGNOSIS — J45.40 MODERATE PERSISTENT ASTHMA WITHOUT COMPLICATION: Primary | ICD-10-CM

## 2024-02-08 DIAGNOSIS — J45.990 EXERCISE INDUCED BRONCHOSPASM: ICD-10-CM

## 2024-02-08 PROCEDURE — 99999 PR PBB SHADOW E&M-EST. PATIENT-LVL IV: CPT | Mod: PBBFAC,,, | Performed by: SPECIALIST

## 2024-02-08 PROCEDURE — 99215 OFFICE O/P EST HI 40 MIN: CPT | Mod: S$GLB,,, | Performed by: SPECIALIST

## 2024-02-08 PROCEDURE — 99417 PROLNG OP E/M EACH 15 MIN: CPT | Mod: S$GLB,,, | Performed by: SPECIALIST

## 2024-02-08 RX ORDER — BUDESONIDE 0.5 MG/2ML
0.5 INHALANT ORAL 2 TIMES DAILY PRN
Qty: 120 ML | Refills: 1 | Status: SHIPPED | OUTPATIENT
Start: 2024-02-08

## 2024-02-08 RX ORDER — ALBUTEROL SULFATE 0.83 MG/ML
2.5 SOLUTION RESPIRATORY (INHALATION) 2 TIMES DAILY PRN
Qty: 180 ML | Refills: 1 | Status: SHIPPED | OUTPATIENT
Start: 2024-02-08

## 2024-02-08 RX ORDER — AMOXICILLIN AND CLAVULANATE POTASSIUM 875; 125 MG/1; MG/1
1 TABLET, FILM COATED ORAL
COMMUNITY

## 2024-02-08 RX ORDER — METHYLPREDNISOLONE 4 MG/1
4 TABLET ORAL
COMMUNITY

## 2024-02-08 RX ORDER — ALBUTEROL SULFATE 90 UG/1
2 AEROSOL, METERED RESPIRATORY (INHALATION) EVERY 6 HOURS PRN
Qty: 18 G | Refills: 2 | Status: SHIPPED | OUTPATIENT
Start: 2024-02-08

## 2024-02-08 RX ORDER — AZITHROMYCIN 250 MG/1
TABLET, FILM COATED ORAL
Qty: 11 TABLET | Refills: 0 | Status: SHIPPED | OUTPATIENT
Start: 2024-02-08 | End: 2024-02-18

## 2024-02-08 NOTE — PROGRESS NOTES
"Subjective:       Patient ID: Xochilt Self is a 38 y.o. female.    Chief Complaint:  Follow-up  LAST SEEN OVER 2 YEARS AGO-- ON 01- 19- 2022-- HAVING ASTHMA FLARE UP-- WITH DAY AND MORE NIGHT ASTHMA SYMPTOMS- NEEDING NEBULIZED ALBUTEROL AND ORAL STEROID    HPI:   female 38 year old ( not immunized with COVID vaccine ) had second COVID since January 20 - 2024, with asthma acute exacerbation that is ongoing with day and night symptoms. Currently on 8 mg Medrol, completing 7th day of Asugmentin and nebulized albuterol needing at nights and day time Albuterol MDI-- ( mail ordered from [facility] ).  Has day and night symptoms and poor exercise tolerance.  Can only take ICS- DOES WELL ON QVAR-- SINCE 2021-- THAT WAS STARTED BY ME. Been getting refills from her internist.  She was evaluated for allergies  by Torsten Colorado Jr . Had allergies to pets and indoor and outdoor aero allergens.  She successfully underwent AIT / SCIT for 3 years- SCIT was stopped 6 years ago.  She is well versed with asthma triggers and environmental control measures.  Smoked 10 cigarettes per day for 10 years. She quit smoking 15 years ago. No recent spirogram or CXRs in several years .  In the last 2 years going through naturopathy, cleansing treatments and weight loss as advised and guided by a " natural medicine person "  Not HAD COVID  vaccine- First COVID was in July 2021 and second one on 01- 20- 2024      Outpatient Medications Marked as Taking for the 2/8/24 encounter (Office Visit) with Jimi Robert MD   Medication Sig Dispense Refill    albuterol (PROVENTIL) 2.5 mg /3 mL (0.083 %) nebulizer solution INHALE ONE VIAL BY NEBULIZATION EVERY 6 HOURS AS NEEDED FOR WHEEZING OR SHORTNESS OF BREATH 180 mL 1    amoxicillin-clavulanate 875-125mg (AUGMENTIN) 875-125 mg per tablet Take 1 tablet by mouth every 12 (twelve) hours.      cholecalciferol, vitamin D3, 125 mcg (5,000 unit) Tab Take 1 tablet by mouth 2 (two) times a day.   "    methylPREDNISolone (MEDROL DOSEPACK) 4 mg tablet Take 4 mg by mouth. use as directed      multivitamin capsule Take 1 capsule by mouth once daily.          Levalbuterol hcl, Albuterol, Animal dander, Cowslip extract, Grass pollen, Hay fever and allergy relief, Horse dander, House dust, Molds extract, Montelukast, Penicillins, Ragweed, and Sulfa (sulfonamide antibiotics)     Past Medical History:   Diagnosis Date    ADD (attention deficit disorder)     Allergy     Anxiety     Asthma     Extrinsic asthma     Irritable bowel syndrome        Family History   Problem Relation Age of Onset    Thyroid disease Mother     Breast cancer Mother         40s    Asthma Mother     Other Mother     Thyroid disease Maternal Grandmother     Breast cancer Maternal Grandmother         older age    Parkinsonism Maternal Grandmother     Colon cancer Maternal Grandfather     Emphysema Maternal Grandfather     Stomach cancer Neg Hx        Environmental History: Dust Mite Controls: Dust mite controls are already in place.     Review of Systems   Constitutional: Negative.  Negative for fatigue and fever.   HENT:  Positive for congestion and postnasal drip. Negative for ear pain, rhinorrhea, sinus pressure, sinus pain, sneezing and sore throat.    Eyes: Negative.  Negative for redness and itching.   Respiratory:  Positive for cough and chest tightness. Negative for shortness of breath and wheezing.    Cardiovascular: Negative.  Negative for chest pain.   Gastrointestinal: Negative.  Negative for nausea.   Endocrine: Negative.  Negative for cold intolerance.   Genitourinary: Negative.  Negative for frequency.   Musculoskeletal: Negative.  Negative for myalgias.   Skin: Negative.  Negative for rash.   Allergic/Immunologic: Positive for environmental allergies. Negative for food allergies and immunocompromised state.   Neurological: Negative.  Negative for dizziness and headaches.   Hematological: Negative.  Negative for adenopathy.  "  Psychiatric/Behavioral: Negative.  Negative for sleep disturbance.      Objective:     Visit Vitals  /84 (BP Location: Left arm, Patient Position: Sitting)   Pulse 69   Temp 98 °F (36.7 °C)   Ht 5' 2" (1.575 m)   Wt 61.5 kg (135 lb 9.3 oz)   LMP 01/20/2024 (Approximate)   BMI 24.80 kg/m²       Physical Exam  Vitals and nursing note reviewed.   Constitutional:       Appearance: Normal appearance. She is normal weight.   HENT:      Head: Normocephalic and atraumatic.      Right Ear: Tympanic membrane, ear canal and external ear normal.      Left Ear: Tympanic membrane, ear canal and external ear normal.      Nose: Congestion present.      Mouth/Throat:      Mouth: Mucous membranes are moist.      Pharynx: Oropharynx is clear.   Eyes:      Extraocular Movements: Extraocular movements intact.      Conjunctiva/sclera: Conjunctivae normal.      Pupils: Pupils are equal, round, and reactive to light.   Cardiovascular:      Rate and Rhythm: Normal rate and regular rhythm.      Pulses: Normal pulses.      Heart sounds: Normal heart sounds.   Pulmonary:      Effort: Pulmonary effort is normal.      Breath sounds: Rhonchi present.   Abdominal:      General: Abdomen is flat. Bowel sounds are normal.      Palpations: Abdomen is soft.   Musculoskeletal:         General: Normal range of motion.      Cervical back: Normal range of motion and neck supple.   Skin:     General: Skin is warm and dry.      Capillary Refill: Capillary refill takes less than 2 seconds.   Neurological:      General: No focal deficit present.      Mental Status: She is alert and oriented to person, place, and time. Mental status is at baseline.   Psychiatric:         Mood and Affect: Mood normal.         Behavior: Behavior normal.         Thought Content: Thought content normal.         Judgment: Judgment normal.       Assessment:      1. Moderate persistent asthma without complication    2. Moderate persistent asthma with acute exacerbation    3. " Exercise induced bronchospasm    4. Cough, unspecified type    5. Dyspnea, unspecified type    6. Bronchitis    7. Recurrent sinusitis    8. Perennial allergic rhinitis with seasonal variation    9. Seasonal allergic conjunctivitis    10     CAN NOT TAKE ALL LABAs OR LAMA- IPRATROPIUM BROMIDE- MAKES ASTHMA WORSE. ONLY OCTAVIO THAT WORKS IS           ALBUTEROL NEBULIZER SOLUTION OR VENTOLIN BRAND ( PROAIR  AND GENERIC ALBUTEROL HAVE NOT WORKED,. )           XOPENEX ( LEVALBUTEROL) CAUSED ANAPHYLAXIS. mEDROL - IN HIGHER THAN 8 mg daily doses makes her jittery .  11    ASTHMA FLARE UP SINCE 01- 20- 2024 COVID - second episode  Plan:     Finish Augmenti- one more dose. Then start on Azithromycin 250 mg- 10 days course.  Medrol 4 mg qd for 10 days.  VENTOLIN HFA - 90 mcg- brand only- NO GENERIC- 2 puffs tid prn.  QVAR 80 mcg-- Redihaler-- 2 puffs bid-- technique reviewed  Nebulized albuterol 0.083 plus Budesonide 0.5 mg bid- until well.  Follow up in 4 weeks-- to do spirogram, repeat CXRs, On 08- 12- 2021--   CXRs revealed ground glass and reticulo nodular densities of lower thirds of the lungs ).- Get HRCT scan chest, if needed  Do CBC to look for peripheral eosinophilia and SARS- CoV- 2 IgG ANTIBODY TITERS  Avoids cat and dog and animals- due to allergy  Gave her information on the Mail in pharmacy in [place]  Did not get COVID vaccine- had 2 episodes of COVID- last one on 01- 20- 2024--  FOLLOW UP IN 4 WEEKS.                Problems Address                                                 Amount and/or Complexity                                                                      Risk       3           [] 2 or more self-limited or minor problems                      [] Limited                                                                        [] Low                  [] 1 stable chronic illness                                                  Any combination of the two                                               OTC  drugs                  []Acute, uncomplicated illness or injury                            Review of prior external notes from unique source           Minor surgery with no risk factors                                                                                                               [] 1 []2  []3+                                                                                                              Review of results from each unique test                                                                                                               [] 1 []2  [] 3+                                                                                                              Order of each unique test                                                                                                               [] 1 []2  [] 3+                                                                                                              Or                                                                                                             [] Assessment requiring an independent historian      4            [] One or more chronic illness with exacerbation,              [] Moderate                                                                      [] Moderate                 Progression, or side effects of treatment                            -test documents or independent historians                        Prescription drug management                []  2 or more sable chronic illnesses                                    [] Independent interpretation of tests                              Minor surgery with identifiable risk                [] 1 undiagnosed new problem with uncertain prognosis    [] Discussion or management of test results                    elective major surgery                [] 1 acute illness with                systemic symptoms                                                                                                                                                               [] 1 acute complicated injury                                                                                                                                          Elective major surgery                                                                                                                                                                                                                                                                                                                                                                                                  5            [] 1 or more chronic illnesses with severe exacerbation,     [] Extensive(two from below)                                         [] High                                                                                                               [] Independent interpretation of results                         Drug therapy requiring intensive                                                                                                               []Discussion of management or test interpretation           monitoring                                                                                                                                                                                                       Decision to de-escalate care                 [] 1 acute or chronic illness or injury that poses a threat                                                                                               Decision regarding hospitalization

## 2024-02-08 NOTE — TELEPHONE ENCOUNTER
----- Message from Nisha Simmons sent at 2/8/2024  7:58 AM CST -----  Contact: johanna Mckoy is requesting a call to schedule an appointment. Please call her back at [phone].      Thanks  DD

## 2024-03-07 ENCOUNTER — PATIENT MESSAGE (OUTPATIENT)
Dept: ALLERGY | Facility: CLINIC | Age: 39
End: 2024-03-07
Payer: COMMERCIAL

## 2024-03-18 ENCOUNTER — PATIENT MESSAGE (OUTPATIENT)
Dept: ALLERGY | Facility: CLINIC | Age: 39
End: 2024-03-18
Payer: COMMERCIAL

## 2024-12-26 ENCOUNTER — TELEPHONE (OUTPATIENT)
Dept: ALLERGY | Facility: CLINIC | Age: 39
End: 2024-12-26
Payer: COMMERCIAL

## 2024-12-26 DIAGNOSIS — J45.40 MODERATE PERSISTENT ASTHMA WITHOUT COMPLICATION: Primary | ICD-10-CM

## 2024-12-26 NOTE — TELEPHONE ENCOUNTER
Called pt and scheduled her for 1/8, pt states her Qvar is on a national shortage and wants to know which medication can you switch her to in the meantime.

## 2024-12-26 NOTE — TELEPHONE ENCOUNTER
----- Message from Wagner sent at 12/26/2024  9:01 AM CST -----  Contact: Xochilt  Appt Request      .Name of Caller  - Xochilt    Reason for Visit/Symptoms -  follow up/ asthma flare up   Best Contact Number or Confirm if Mychart Preferred- Call back at .[phone]   Preferred Date/Time of Appointment - 1st available at [place] location    Interested in Virtual Visit (yes/no)- in person   Additional Information     Thanks

## 2024-12-28 RX ORDER — BUDESONIDE AND FORMOTEROL FUMARATE DIHYDRATE 160; 4.5 UG/1; UG/1
2 AEROSOL RESPIRATORY (INHALATION) EVERY 12 HOURS
Qty: 10.2 G | Refills: 2 | Status: SHIPPED | OUTPATIENT
Start: 2024-12-28 | End: 2025-12-28

## 2025-01-02 DIAGNOSIS — J45.40 MODERATE PERSISTENT ASTHMA WITHOUT COMPLICATION: ICD-10-CM

## 2025-01-02 RX ORDER — BUDESONIDE AND FORMOTEROL FUMARATE DIHYDRATE 160; 4.5 UG/1; UG/1
2 AEROSOL RESPIRATORY (INHALATION) EVERY 12 HOURS
Qty: 30.6 G | Refills: 3 | Status: SHIPPED | OUTPATIENT
Start: 2025-01-02 | End: 2026-01-02

## 2025-01-07 NOTE — PROGRESS NOTES
Subjective:       Patient ID: Xochilt Self is a 39 y.o. female.    Chief Complaint:    Follow up on Moderate Persistent asthma / EIB and Perennial ALLERGIC RHINITIS AND MULTIPLE DRUG ALLERGIES.  EX- Cigarette smoker    HPI:   female 39- year old with the above complaints- for follow up.   Last seen by me a year ago. Last CXRs on 08- 12- 2021  showed ground glass and reticulo- nodular appearance .  She had another CXRs in [facility] on December 17, 2024- She will have the results faxed to me. If they are abnormal, get HRCT THORAX.  -   Last CBC and diff on 01- 10- 2023-- revealed peripheral eosiophilia. Her AEC was 1016 K. She had  K on 10- 02- 2024.    I suggested that she be started on Anti - IL-5 or Anti- IL-5-R - alpha to treat Eosinophilic asthma.  She has been ill in the last 2 weeks- with cough, wheeze and dyspnea.  She ran out of QVAR- over 3 months ago- ( shortage of 80 mcg QVAR ) . Since the has ongoing asthma- day and night and activity- induced symptoms.  SHE IS ALLERGIC TO LEVALBUTEROL , ALL LABAs and LAMA.   Can only take ICS and Albuterol- MDI or nebulized with nebulized budesonide ( now on once daily dosing- I want her on BID.) .  She is an ex- cigarette smoker . She , per previous SPTs has allergies to indoor and outdoor aero allergens. No longer on SCIT.  She is well versed with allergen control measures and asthma triggers.               Levalbuterol hcl, Albuterol, Animal dander, Cowslip extract, Grass pollen, Hay fever and allergy relief, Horse dander, House dust, Molds extract, Montelukast, Penicillins, Ragweed, and Sulfa (sulfonamide antibiotics) - allergies were reported    Past Medical History:   Diagnosis Date    ADD (attention deficit disorder)     Allergy     Anxiety     Asthma     Extrinsic asthma     Fibroids     Irritable bowel syndrome        Family History   Problem Relation Name Age of Onset    Thyroid disease Mother      Breast cancer Mother          40s     Asthma Mother      Other Mother      Thyroid disease Maternal Grandmother      Breast cancer Maternal Grandmother          older age    Parkinsonism Maternal Grandmother      Colon cancer Maternal Grandfather      Emphysema Maternal Grandfather      Stomach cancer Neg Hx         Environmental History: Dust Mite Controls: Dust mite controls are already in place.     Review of Systems   Constitutional:  Positive for fatigue.   HENT:  Positive for congestion, postnasal drip and rhinorrhea.    Eyes: Negative.    Respiratory:  Positive for cough, chest tightness and wheezing.    Cardiovascular: Negative.    Gastrointestinal: Negative.    Endocrine: Negative.    Genitourinary: Negative.    Musculoskeletal: Negative.    Skin: Negative.    Allergic/Immunologic: Positive for environmental allergies.   Neurological: Negative.    Hematological: Negative.    Psychiatric/Behavioral: Negative.       Objective:     There were no vitals taken for this visit.    Physical Exam  Vitals and nursing note reviewed.   Constitutional:       Appearance: Normal appearance. She is obese.   HENT:      Head: Normocephalic and atraumatic.      Right Ear: Tympanic membrane normal.      Left Ear: Tympanic membrane normal.      Nose: Congestion and rhinorrhea present.      Mouth/Throat:      Mouth: Mucous membranes are moist.      Pharynx: Oropharynx is clear.   Eyes:      Extraocular Movements: Extraocular movements intact.      Conjunctiva/sclera: Conjunctivae normal.      Pupils: Pupils are equal, round, and reactive to light.   Cardiovascular:      Rate and Rhythm: Normal rate and regular rhythm.      Pulses: Normal pulses.      Heart sounds: Normal heart sounds.   Pulmonary:      Breath sounds: Wheezing and rhonchi present.   Abdominal:      General: Abdomen is flat. Bowel sounds are normal.      Palpations: Abdomen is soft.   Musculoskeletal:         General: Normal range of motion.   Skin:     General: Skin is warm and dry.      Capillary  Refill: Capillary refill takes less than 2 seconds.   Neurological:      General: No focal deficit present.      Mental Status: She is alert and oriented to person, place, and time. Mental status is at baseline.   Psychiatric:         Mood and Affect: Mood normal.         Behavior: Behavior normal.         Thought Content: Thought content normal.         Judgment: Judgment normal.       Assessment:      1. Mild persistent asthma without complication    2. Perennial allergic rhinitis with seasonal variation    3. Recurrent sinusitis    4. Cough in adult    5. Dyspnea, unspecified type    6. Exercise induced bronchospasm    7        Seasonal allergic conjunctivitis  8       ? History of peanut beans allergy.  9        Can only take ICS- On QVAR - AND Proair HFA  since 2021- prescribed by me.             She can not tolerate LABA or LAMA- DUE TO SIDE EFFECTS.  10      EX- CIGARETTE SMOKER. SMOKED 10 CIGARETTES PER DAY FOR 10 YEARS.            Quit smoking 17 years ago.  11       Abnormal CXRs on 02- 08- 2024. Groundglass appearance and  reticulonodular densities of the lower lobes. No spirogram             In years. May dean HRCT scan chest  12       hADE SEVERE REACTINS AFTER xopenex, all labaS and LAMA- CAN ONLY TAKE4 ICS AND ALBUTEROL.    Plan:       Monitor Peripheral Eosinophilia.. Last CBC in [facility]--- 1016 K on 01- 10- 2023 and 645 K on 10- 02- 2024. Rest of the laboratory tests done by Ethan Dasilva MD, TSH, T4, CMP, U/A, FSH, LHAND VITAMIN - D LEVELS WERE NORMAL ON 10- 02- 2024. Post prandial serum Insulin level was high , suggestive of Insulin Resistance.  QVAR 80 mcg 2 puffs bid  Proair HFA 90 mcg 2 puffs tid prn.  Nebulized albuterol 0.083 plus Budesonide 0.5 mg - tid prn.  Was successfully oin SCIT / AIT for 3 years - stopped SCIT 7 years ago.  Did not take COVID vaccine- Had 2 COVID episodes- last one in JULY 2021  Re - emphasized environmental control measures.  Because of peripheral eosinophilia and  EOSINOPHILIC ASTHMA, I SUGGESTED THAT SHE START ON NUCALA OR FASENRA.[email or web address].  Recommended CT THORAX- HIGH RESOLUTION.]  Mycoplasma antibody titers IgG and IgM were ordered.  If symptoms persist start on a course of Azithromycin and Medrol dosepack  Annual influenza vaccination.  Follow up in 3 months.-- Get HRCT THORAX AND START ON FASENRA.                  Problems Address                                                 Amount and/or Complexity                                                                      Risk       3           [] 2 or more self-limited or minor problems                      [] Limited                                                                        [] Low                  [] 1 stable chronic illness                                                  Any combination of the two                                               OTC drugs                  []Acute, uncomplicated illness or injury                            Review of prior external notes from unique source           Minor surgery with no risk factors                                                                                                               [] 1 []2  []3+                                                                                                              Review of results from each unique test                                                                                                               [] 1 []2  [] 3+                                                                                                              Order of each unique test                                                                                                               [] 1 []2  [] 3+                                                                                                              Or                                                                                                             []  Assessment requiring an independent historian      4            [] One or more chronic illness with exacerbation,              [] Moderate                                                                      [] Moderate                 Progression, or side effects of treatment                            -test documents or independent historians                        Prescription drug management                []  2 or more sable chronic illnesses                                    [] Independent interpretation of tests                              Minor surgery with identifiable risk                [] 1 undiagnosed new problem with uncertain prognosis    [] Discussion or management of test results                    elective major surgery                [] 1 acute illness with                systemic symptoms                                                                                                                                                              [] 1 acute complicated injury                                                                                                                                          Elective major surgery                                                                                                                                                                                                                                                                                                                                                                                                  5            [x] 1 or more chronic illnesses with severe exacerbation,     [x] Extensive(two from below)                                         [x] High                                                                                                               [] Independent interpretation of results                         Drug therapy requiring intensive                                                                                                                []Discussion of management or test interpretation           monitoring                                                                                                                                                                                                       Decision to de-escalate care                 [] 1 acute or chronic illness or injury that poses a threat                                                                                               Decision regarding hospitalization

## 2025-01-08 ENCOUNTER — LAB VISIT (OUTPATIENT)
Dept: LAB | Facility: HOSPITAL | Age: 40
End: 2025-01-08
Attending: SPECIALIST
Payer: COMMERCIAL

## 2025-01-08 ENCOUNTER — OFFICE VISIT (OUTPATIENT)
Dept: ALLERGY | Facility: CLINIC | Age: 40
End: 2025-01-08
Payer: COMMERCIAL

## 2025-01-08 VITALS
DIASTOLIC BLOOD PRESSURE: 78 MMHG | WEIGHT: 176.56 LBS | BODY MASS INDEX: 32.49 KG/M2 | SYSTOLIC BLOOD PRESSURE: 116 MMHG | TEMPERATURE: 99 F | HEART RATE: 69 BPM | HEIGHT: 62 IN

## 2025-01-08 DIAGNOSIS — R06.00 DYSPNEA, UNSPECIFIED TYPE: ICD-10-CM

## 2025-01-08 DIAGNOSIS — R05.9 COUGH IN ADULT: ICD-10-CM

## 2025-01-08 DIAGNOSIS — J30.89 PERENNIAL ALLERGIC RHINITIS WITH SEASONAL VARIATION: ICD-10-CM

## 2025-01-08 DIAGNOSIS — J45.30 MILD PERSISTENT ASTHMA WITHOUT COMPLICATION: Primary | ICD-10-CM

## 2025-01-08 DIAGNOSIS — J30.2 PERENNIAL ALLERGIC RHINITIS WITH SEASONAL VARIATION: ICD-10-CM

## 2025-01-08 DIAGNOSIS — J32.9 RECURRENT SINUSITIS: ICD-10-CM

## 2025-01-08 DIAGNOSIS — J45.30 MILD PERSISTENT ASTHMA WITHOUT COMPLICATION: ICD-10-CM

## 2025-01-08 DIAGNOSIS — J45.990 EXERCISE INDUCED BRONCHOSPASM: ICD-10-CM

## 2025-01-08 LAB
BASOPHILS # BLD AUTO: 0.04 K/UL (ref 0–0.2)
BASOPHILS NFR BLD: 0.6 % (ref 0–1.9)
DIFFERENTIAL METHOD BLD: ABNORMAL
EOSINOPHIL # BLD AUTO: 0.8 K/UL (ref 0–0.5)
EOSINOPHIL NFR BLD: 12.5 % (ref 0–8)
ERYTHROCYTE [DISTWIDTH] IN BLOOD BY AUTOMATED COUNT: 12 % (ref 11.5–14.5)
HCT VFR BLD AUTO: 40.3 % (ref 37–48.5)
HGB BLD-MCNC: 13.5 G/DL (ref 12–16)
IMM GRANULOCYTES # BLD AUTO: 0.03 K/UL (ref 0–0.04)
IMM GRANULOCYTES NFR BLD AUTO: 0.5 % (ref 0–0.5)
LYMPHOCYTES # BLD AUTO: 1.2 K/UL (ref 1–4.8)
LYMPHOCYTES NFR BLD: 18.1 % (ref 18–48)
MCH RBC QN AUTO: 30.2 PG (ref 27–31)
MCHC RBC AUTO-ENTMCNC: 33.5 G/DL (ref 32–36)
MCV RBC AUTO: 90 FL (ref 82–98)
MONOCYTES # BLD AUTO: 0.5 K/UL (ref 0.3–1)
MONOCYTES NFR BLD: 8.4 % (ref 4–15)
NEUTROPHILS # BLD AUTO: 3.8 K/UL (ref 1.8–7.7)
NEUTROPHILS NFR BLD: 59.9 % (ref 38–73)
NRBC BLD-RTO: 0 /100 WBC
PLATELET # BLD AUTO: 247 K/UL (ref 150–450)
PMV BLD AUTO: 9.5 FL (ref 9.2–12.9)
RBC # BLD AUTO: 4.47 M/UL (ref 4–5.4)
WBC # BLD AUTO: 6.4 K/UL (ref 3.9–12.7)

## 2025-01-08 PROCEDURE — 86738 MYCOPLASMA ANTIBODY: CPT | Performed by: SPECIALIST

## 2025-01-08 PROCEDURE — 36415 COLL VENOUS BLD VENIPUNCTURE: CPT | Performed by: SPECIALIST

## 2025-01-08 PROCEDURE — 99215 OFFICE O/P EST HI 40 MIN: CPT | Mod: S$GLB,,, | Performed by: SPECIALIST

## 2025-01-08 PROCEDURE — 85025 COMPLETE CBC W/AUTO DIFF WBC: CPT | Performed by: SPECIALIST

## 2025-01-08 PROCEDURE — 99999 PR PBB SHADOW E&M-EST. PATIENT-LVL IV: CPT | Mod: PBBFAC,,, | Performed by: SPECIALIST

## 2025-01-08 RX ORDER — ALBUTEROL SULFATE 0.83 MG/ML
2.5 SOLUTION RESPIRATORY (INHALATION) 2 TIMES DAILY
Qty: 180 ML | Refills: 3 | Status: SHIPPED | OUTPATIENT
Start: 2025-01-08 | End: 2025-02-07

## 2025-01-08 RX ORDER — BUDESONIDE 0.5 MG/2ML
0.5 INHALANT ORAL 2 TIMES DAILY
Qty: 120 ML | Refills: 3 | Status: SHIPPED | OUTPATIENT
Start: 2025-01-08 | End: 2025-02-07

## 2025-01-08 RX ORDER — BECLOMETHASONE DIPROPIONATE HFA 40 UG/1
80 AEROSOL, METERED RESPIRATORY (INHALATION) 2 TIMES DAILY
Qty: 10.6 G | Refills: 3 | Status: SHIPPED | OUTPATIENT
Start: 2025-01-08 | End: 2025-02-07

## 2025-01-09 ENCOUNTER — PATIENT MESSAGE (OUTPATIENT)
Dept: ALLERGY | Facility: CLINIC | Age: 40
End: 2025-01-09
Payer: COMMERCIAL

## 2025-01-09 DIAGNOSIS — J40 BRONCHITIS: ICD-10-CM

## 2025-01-09 DIAGNOSIS — J32.9 RECURRENT SINUSITIS: Primary | ICD-10-CM

## 2025-01-09 RX ORDER — AZITHROMYCIN 250 MG/1
TABLET, FILM COATED ORAL
Qty: 11 TABLET | Refills: 0 | Status: SHIPPED | OUTPATIENT
Start: 2025-01-09

## 2025-01-09 NOTE — TELEPHONE ENCOUNTER
Results conveyed to pt. Pt voiced understanding. I will show you the CXR report from Urgent Care. I was able to locate results.  Please sign pended Rx below for Azithromycin.

## 2025-01-09 NOTE — TELEPHONE ENCOUNTER
----- Message from Jimi Robert MD sent at 1/9/2025  6:52 AM CST -----  Please convey.  CBC of Jan 08, 2025- reveals elevated Eosinophil count -- AEC - 813- Normal less than 150.  She has Eosinophilic asthma.  Mycoplasma titers pending./  Please fax CXR results from the urgent care.  Bayron I want her started on Azithromycin, x 10 DAYS  ----- Message -----  From: [email or web address] Interface  Sent: 1/8/2025   2:31 PM CST  To: Jimi Robert MD

## 2025-01-10 LAB
M PNEUMO IGG SER IA-ACNC: POSITIVE
M PNEUMO IGM SER IA-ACNC: NEGATIVE
MYCOPLASMA PNEUMONIAE AB INTERPRETATION: ABNORMAL

## 2025-02-27 ENCOUNTER — PATIENT MESSAGE (OUTPATIENT)
Dept: ALLERGY | Facility: CLINIC | Age: 40
End: 2025-02-27
Payer: COMMERCIAL

## 2025-04-23 NOTE — PROGRESS NOTES
Subjective:       Patient ID: Xochilt Self is a 39 y.o. female.    Chief Complaint:    FOLLOW UP ON PERSISTENT / EOSINOPHILIC ASTHMA /EIB/ COUGH/ DYSPNEA/ PERENNIAL ALLERGIC RHINITIS, PERIPHERAL EOSINOPHILIA, ADD, EX CIGARETTE SMOKER, MULTIPLE DRUG ALLERGIES-- ALLERGY TOLABA AND XOPENEX.      HPI:     female , 39- year old, with the above complaints, for follow up visit.    The patient is doing well overall. Starting back on QVAR 40 or 80 mcg---=2 puffs BID along with Proair HFA 90 mcg - bid has helped her a lot. She loves QVAR though expensive-- She will get it from [employer].      Any exacerbation will be treated with Duoneb and Budesonide nebulized treatments.    Has normal immune work up  on 06- 16- 2021.  Had normal CXRs=-- 03- 13- 20922 and 12- 17- 2024 ( [facility]).  SPIROGRAM WAS NORMAL TODAY.  She is an ex- cigarette smoker- quit smoking 17 years ago..    Shall monitor peripheral eosinophil conts and AEC-- Next visit in September 2025.  TREATMENT PLAN DISCUSSED.                     Levalbuterol hcl, Albuterol, Animal dander, [place] extract, Grass pollen, Hay fever and allergy relief, Horse dander, House dust, Molds extract, Montelukast, Penicillins, Ragweed, and Sulfa (sulfonamide antibiotics) -- ALLERGIES WERE REPORTED.    Past Medical History:   Diagnosis Date    ADD (attention deficit disorder)     Allergy     Anxiety     Asthma     Extrinsic asthma     Fibroids     Irritable bowel syndrome        Family History   Problem Relation Name Age of Onset    Thyroid disease Mother      Breast cancer Mother          40s    Asthma Mother      Other Mother      Thyroid disease Maternal Grandmother      Breast cancer Maternal Grandmother          older age    Parkinsonism Maternal Grandmother      Colon cancer Maternal Grandfather      Emphysema Maternal Grandfather      Stomach cancer Neg Hx         Environmental History: Dust Mite Controls: Dust mite controls are already in place.      Review of Systems   Constitutional: Negative.    HENT:  Positive for congestion, postnasal drip and rhinorrhea.    Eyes: Negative.    Respiratory:  Positive for cough.    Cardiovascular: Negative.    Gastrointestinal: Negative.    Endocrine: Negative.    Genitourinary: Negative.    Musculoskeletal: Negative.    Skin: Negative.    Allergic/Immunologic: Positive for environmental allergies.   Neurological: Negative.    Hematological: Negative.    Psychiatric/Behavioral: Negative.  Negative for decreased concentration.      Objective:     There were no vitals taken for this visit.    Physical Exam  Vitals and nursing note reviewed.   Constitutional:       Appearance: Normal appearance. She is obese.   HENT:      Head: Normocephalic and atraumatic.      Right Ear: Tympanic membrane, ear canal and external ear normal.      Left Ear: Tympanic membrane, ear canal and external ear normal.      Nose: Congestion present.      Mouth/Throat:      Mouth: Mucous membranes are moist.      Pharynx: Oropharynx is clear.   Eyes:      Extraocular Movements: Extraocular movements intact.      Conjunctiva/sclera: Conjunctivae normal.      Pupils: Pupils are equal, round, and reactive to light.   Cardiovascular:      Rate and Rhythm: Normal rate and regular rhythm.      Pulses: Normal pulses.      Heart sounds: Normal heart sounds.   Pulmonary:      Effort: Pulmonary effort is normal.      Breath sounds: Normal breath sounds.   Abdominal:      General: Abdomen is flat. Bowel sounds are normal.      Palpations: Abdomen is soft.   Musculoskeletal:         General: Normal range of motion.      Cervical back: Normal range of motion and neck supple.   Skin:     General: Skin is warm and dry.      Capillary Refill: Capillary refill takes less than 2 seconds.   Neurological:      General: No focal deficit present.      Mental Status: She is alert and oriented to person, place, and time. Mental status is at baseline.   Psychiatric:          Mood and Affect: Mood normal.         Behavior: Behavior normal.         Thought Content: Thought content normal.         Judgment: Judgment normal.       Assessment:      1. Mild persistent asthma without complication    2. Perennial allergic rhinitis with seasonal variation    3. Recurrent sinusitis    4. Cough in adult    5. Shortness of breath    6. Exercise-induced asthma    7. Ex-cigarette smoker    8       ALLERGY TO XOPENEX .           CAN NOT TAKE LABA,           CAN ONLY TAKE ICS AND OCTAVIO- Albuterol.  9      PERIPHERAL EOSINOPHILIA-- AEC on 01- 08- 2025-- 800 K/=EOSINOPHILIC ASTHMA--           If asthma is not controlled--POTENTIALLY START ON NUCALA OR FASENRA  10    May treat all infections-- A course of Azithromycin a250 mg plus Prednisone 20 mg qd for 3- 5 days    Plan:     SPIROGRAM DONE TODAY- NORMAL.  FEV1 93 % -PRED, FVC-- 97 % PRED, FEV1 / FVC -- 96 % and FEF 25- 75- % 66 % predicted.  ---------------------------------------------------------------------------------------------------------------------------------------------  Monitor for peripheral Eosinophilia  QVAR 80 mcg Redihaler 1- 2 puffs bid  Proair HFA 90 mcg 2 puffs BID  prn  Reviewd inhaler technique.  CAN NOT HAVE XOPENEX AND LABAs.  --------------------------------------------------------------    For asthma flare ups, nebulized Albuterol 0.083 plus Budesonide 0.5 mg bid  Stopped SCIT after taking SCIT for 3 years  Quit smoking 17 years ago  --------------------------------------------------------------------------  Marques Kimble - 12- 17- 2024- compared to 06- 16- 2021---- NORMAL except blunting of the left costophrenic angle  -------------------------------------------------------------------------------------------  On 06- 16- 2021-- Serum IgG was low, had normal IgA and IgM.-- Pneumococcal antibody titers protective and in extremely high titers for all 23  serotypes.  ----------------------------------------------------------------------------------------------------------------------------------  FOLLOW UP IN SEPTEMBER 2025.                    Problems Address                                                 Amount and/or Complexity                                                                      Risk       3           [] 2 or more self-limited or minor problems                      [] Limited                                                                        [] Low                  [] 1 stable chronic illness                                                  Any combination of the two                                               OTC drugs                  []Acute, uncomplicated illness or injury                            Review of prior external notes from unique source           Minor surgery with no risk factors                                                                                                               [] 1 []2  []3+                                                                                                              Review of results from each unique test                                                                                                               [] 1 []2  [] 3+                                                                                                              Order of each unique test                                                                                                               [] 1 []2  [] 3+                                                                                                              Or                                                                                                             [] Assessment requiring an independent historian      4            [] One or more chronic illness with exacerbation,              [] Moderate                                                                       [] Moderate                 Progression, or side effects of treatment                            -test documents or independent historians                        Prescription drug management                []  2 or more sable chronic illnesses                                    [] Independent interpretation of tests                              Minor surgery with identifiable risk                [] 1 undiagnosed new problem with uncertain prognosis    [] Discussion or management of test results                    elective major surgery                [] 1 acute illness with                systemic symptoms                                                                                                                                                              [] 1 acute complicated injury                                                                                                                                          Elective major surgery                                                                                                                                                                                                                                                                                                                                                                                                  5            [x] 1 or more chronic illnesses with severe exacerbation,     [x] Extensive(two from below)                                         [x] High                                                                                                               [] Independent interpretation of results                         Drug therapy requiring intensive                                                                                                               []Discussion of management or test interpretation           monitoring                                                                                                                                                                                                        Decision to de-escalate care                 [] 1 acute or chronic illness or injury that poses a threat                                                                                               Decision regarding hospitalization

## 2025-04-24 ENCOUNTER — OFFICE VISIT (OUTPATIENT)
Dept: ALLERGY | Facility: CLINIC | Age: 40
End: 2025-04-24
Payer: COMMERCIAL

## 2025-04-24 VITALS
WEIGHT: 171.06 LBS | TEMPERATURE: 98 F | DIASTOLIC BLOOD PRESSURE: 76 MMHG | BODY MASS INDEX: 31.29 KG/M2 | SYSTOLIC BLOOD PRESSURE: 116 MMHG

## 2025-04-24 DIAGNOSIS — Z87.891 EX-CIGARETTE SMOKER: ICD-10-CM

## 2025-04-24 DIAGNOSIS — J45.990 EXERCISE-INDUCED ASTHMA: ICD-10-CM

## 2025-04-24 DIAGNOSIS — J32.9 RECURRENT SINUSITIS: ICD-10-CM

## 2025-04-24 DIAGNOSIS — J45.30 MILD PERSISTENT ASTHMA WITHOUT COMPLICATION: Primary | ICD-10-CM

## 2025-04-24 DIAGNOSIS — J45.40 MODERATE PERSISTENT ASTHMA WITHOUT COMPLICATION: ICD-10-CM

## 2025-04-24 DIAGNOSIS — J30.89 PERENNIAL ALLERGIC RHINITIS WITH SEASONAL VARIATION: ICD-10-CM

## 2025-04-24 DIAGNOSIS — J30.2 PERENNIAL ALLERGIC RHINITIS WITH SEASONAL VARIATION: ICD-10-CM

## 2025-04-24 DIAGNOSIS — R06.02 SHORTNESS OF BREATH: ICD-10-CM

## 2025-04-24 DIAGNOSIS — R05.9 COUGH IN ADULT: ICD-10-CM

## 2025-04-24 LAB
FEF 25 75 LLN: 2.13
FEF 25 75 PRE REF: 65.8 %
FEF 25 75 REF: 3.53
FEV1 FVC LLN: 71
FEV1 FVC PRE REF: 95.6 %
FEV1 FVC REF: 82
FEV1 LLN: 2.23
FEV1 PRE REF: 92.7 %
FEV1 REF: 2.81
FVC LLN: 2.72
FVC PRE REF: 96.6 %
FVC REF: 3.42
PEF LLN: 5.06
PEF PRE REF: 111 %
PEF REF: 6.67
PRE FEF 25 75: 2.32 L/S (ref 2.13–4.93)
PRE FET 100: 6.22 SEC
PRE FEV1 FVC: 78.83 % (ref 71.44–91.59)
PRE FEV1: 2.6 L (ref 2.23–3.36)
PRE FVC: 3.3 L (ref 2.72–4.14)
PRE PEF: 7.4 L/S (ref 5.06–8.27)

## 2025-04-24 PROCEDURE — 99999 PR PBB SHADOW E&M-EST. PATIENT-LVL IV: CPT | Mod: PBBFAC,,, | Performed by: SPECIALIST

## 2025-04-24 RX ORDER — TRAZODONE HYDROCHLORIDE 50 MG/1
50 TABLET ORAL NIGHTLY
COMMUNITY

## 2025-04-24 RX ORDER — BECLOMETHASONE DIPROPIONATE HFA 80 UG/1
2 AEROSOL, METERED RESPIRATORY (INHALATION) 2 TIMES DAILY
Qty: 31.8 G | Refills: 2 | Status: SHIPPED | OUTPATIENT
Start: 2025-04-24 | End: 2025-07-23

## 2025-09-02 PROBLEM — D72.19 PERIPHERAL EOSINOPHILIA: Status: ACTIVE | Noted: 2025-09-02

## 2025-09-03 ENCOUNTER — OFFICE VISIT (OUTPATIENT)
Dept: ALLERGY | Facility: CLINIC | Age: 40
End: 2025-09-03
Payer: COMMERCIAL

## 2025-09-03 VITALS
DIASTOLIC BLOOD PRESSURE: 82 MMHG | WEIGHT: 173.06 LBS | SYSTOLIC BLOOD PRESSURE: 125 MMHG | BODY MASS INDEX: 31.65 KG/M2 | TEMPERATURE: 98 F | HEART RATE: 68 BPM

## 2025-09-03 DIAGNOSIS — D72.19 PERIPHERAL EOSINOPHILIA: ICD-10-CM

## 2025-09-03 DIAGNOSIS — J30.2 PERENNIAL ALLERGIC RHINITIS WITH SEASONAL VARIATION: ICD-10-CM

## 2025-09-03 DIAGNOSIS — J40 BRONCHITIS: ICD-10-CM

## 2025-09-03 DIAGNOSIS — J20.9 ACUTE BRONCHITIS, UNSPECIFIED ORGANISM: Primary | ICD-10-CM

## 2025-09-03 DIAGNOSIS — J30.89 PERENNIAL ALLERGIC RHINITIS WITH SEASONAL VARIATION: ICD-10-CM

## 2025-09-03 DIAGNOSIS — R07.9 ACUTE CHEST PAIN: ICD-10-CM

## 2025-09-03 DIAGNOSIS — J32.9 RECURRENT SINUSITIS: ICD-10-CM

## 2025-09-03 DIAGNOSIS — J45.990 EXERCISE INDUCED BRONCHOSPASM: ICD-10-CM

## 2025-09-03 DIAGNOSIS — R06.00 DYSPNEA, UNSPECIFIED TYPE: ICD-10-CM

## 2025-09-03 DIAGNOSIS — J45.40 MODERATE PERSISTENT ASTHMA WITHOUT COMPLICATION: ICD-10-CM

## 2025-09-03 DIAGNOSIS — R05.9 COUGH IN ADULT: ICD-10-CM

## 2025-09-03 DIAGNOSIS — Z87.891 EX-CIGARETTE SMOKER: ICD-10-CM

## 2025-09-03 PROCEDURE — 99999 PR PBB SHADOW E&M-EST. PATIENT-LVL IV: CPT | Mod: PBBFAC,,, | Performed by: SPECIALIST

## 2025-09-03 PROCEDURE — 99417 PROLNG OP E/M EACH 15 MIN: CPT | Mod: S$GLB,,, | Performed by: SPECIALIST

## 2025-09-03 PROCEDURE — 99215 OFFICE O/P EST HI 40 MIN: CPT | Mod: S$GLB,,, | Performed by: SPECIALIST

## 2025-09-03 RX ORDER — BECLOMETHASONE DIPROPIONATE HFA 80 UG/1
160 AEROSOL, METERED RESPIRATORY (INHALATION) 2 TIMES DAILY
Qty: 10.6 G | Refills: 6 | Status: SHIPPED | OUTPATIENT
Start: 2025-09-03 | End: 2025-10-03

## 2025-09-03 RX ORDER — BUDESONIDE 0.5 MG/2ML
0.5 INHALANT ORAL 2 TIMES DAILY PRN
Qty: 120 ML | Refills: 1 | Status: SHIPPED | OUTPATIENT
Start: 2025-09-03

## 2025-09-03 RX ORDER — CLARITHROMYCIN 500 MG/1
500 TABLET, FILM COATED ORAL
Qty: 20 TABLET | Refills: 0 | Status: SHIPPED | OUTPATIENT
Start: 2025-09-03 | End: 2025-09-13

## 2025-09-03 RX ORDER — ALBUTEROL SULFATE 0.83 MG/ML
2.5 SOLUTION RESPIRATORY (INHALATION) EVERY 6 HOURS PRN
Qty: 180 ML | Refills: 1 | Status: SHIPPED | OUTPATIENT
Start: 2025-09-03

## 2025-09-03 RX ORDER — ALBUTEROL SULFATE 90 UG/1
2 INHALANT RESPIRATORY (INHALATION) EVERY 6 HOURS PRN
Qty: 18 G | Refills: 5 | Status: SHIPPED | OUTPATIENT
Start: 2025-09-03

## 2025-09-03 RX ORDER — AZITHROMYCIN 250 MG/1
TABLET, FILM COATED ORAL
Qty: 11 TABLET | Refills: 0 | Status: SHIPPED | OUTPATIENT
Start: 2025-09-03 | End: 2025-09-13